# Patient Record
Sex: FEMALE | Race: OTHER | NOT HISPANIC OR LATINO | URBAN - METROPOLITAN AREA
[De-identification: names, ages, dates, MRNs, and addresses within clinical notes are randomized per-mention and may not be internally consistent; named-entity substitution may affect disease eponyms.]

---

## 2024-08-02 ENCOUNTER — APPOINTMENT (RX ONLY)
Dept: URBAN - METROPOLITAN AREA CLINIC 334 | Facility: CLINIC | Age: 34
Setting detail: DERMATOLOGY
End: 2024-08-02

## 2024-08-02 DIAGNOSIS — Z12.83 ENCOUNTER FOR SCREENING FOR MALIGNANT NEOPLASM OF SKIN: ICD-10-CM

## 2024-08-02 DIAGNOSIS — D18.0 HEMANGIOMA: ICD-10-CM

## 2024-08-02 DIAGNOSIS — L81.4 OTHER MELANIN HYPERPIGMENTATION: ICD-10-CM

## 2024-08-02 DIAGNOSIS — L82.1 OTHER SEBORRHEIC KERATOSIS: ICD-10-CM

## 2024-08-02 DIAGNOSIS — L73.2 HIDRADENITIS SUPPURATIVA: ICD-10-CM | Status: INADEQUATELY CONTROLLED

## 2024-08-02 PROBLEM — D18.01 HEMANGIOMA OF SKIN AND SUBCUTANEOUS TISSUE: Status: ACTIVE | Noted: 2024-08-02

## 2024-08-02 PROCEDURE — 99204 OFFICE O/P NEW MOD 45 MIN: CPT

## 2024-08-02 PROCEDURE — ? FULL BODY SKIN EXAM

## 2024-08-02 PROCEDURE — ? PRESCRIPTION

## 2024-08-02 PROCEDURE — ? COUNSELING

## 2024-08-02 PROCEDURE — ? TREATMENT REGIMEN

## 2024-08-02 RX ORDER — CLINDAMYCIN PHOSPHATE 10 MG/ML
SOLUTION TOPICAL QD
Qty: 60 | Refills: 4 | Status: ERX | COMMUNITY
Start: 2024-08-02

## 2024-08-02 RX ADMIN — CLINDAMYCIN PHOSPHATE: 10 SOLUTION TOPICAL at 00:00

## 2024-08-02 ASSESSMENT — LOCATION DETAILED DESCRIPTION DERM
LOCATION DETAILED: RIGHT INFERIOR MEDIAL UPPER BACK
LOCATION DETAILED: LEFT MEDIAL BREAST 10-11:00 REGION
LOCATION DETAILED: UPPER STERNUM
LOCATION DETAILED: PERIUMBILICAL SKIN
LOCATION DETAILED: MONS PUBIS

## 2024-08-02 ASSESSMENT — LOCATION SIMPLE DESCRIPTION DERM
LOCATION SIMPLE: RIGHT UPPER BACK
LOCATION SIMPLE: LEFT BREAST
LOCATION SIMPLE: CHEST
LOCATION SIMPLE: ABDOMEN
LOCATION SIMPLE: GROIN

## 2024-08-02 ASSESSMENT — LOCATION ZONE DERM
LOCATION ZONE: TRUNK
LOCATION ZONE: VULVA

## 2024-11-11 ENCOUNTER — OFFICE VISIT (OUTPATIENT)
Dept: URBAN - METROPOLITAN AREA CLINIC 9 | Facility: CLINIC | Age: 34
End: 2024-11-11

## 2024-12-30 ENCOUNTER — DASHBOARD ENCOUNTERS (OUTPATIENT)
Age: 34
End: 2024-12-30

## 2024-12-30 ENCOUNTER — OFFICE VISIT (OUTPATIENT)
Dept: URBAN - METROPOLITAN AREA CLINIC 9 | Facility: CLINIC | Age: 34
End: 2024-12-30
Payer: COMMERCIAL

## 2024-12-30 ENCOUNTER — OFFICE VISIT (OUTPATIENT)
Dept: URBAN - METROPOLITAN AREA CLINIC 9 | Facility: CLINIC | Age: 34
End: 2024-12-30

## 2024-12-30 VITALS
HEIGHT: 66 IN | SYSTOLIC BLOOD PRESSURE: 110 MMHG | BODY MASS INDEX: 37.28 KG/M2 | WEIGHT: 232 LBS | DIASTOLIC BLOOD PRESSURE: 80 MMHG

## 2024-12-30 DIAGNOSIS — K80.50 BILIARY COLIC: ICD-10-CM

## 2024-12-30 DIAGNOSIS — R10.11 RUQ PAIN: ICD-10-CM

## 2024-12-30 DIAGNOSIS — D13.5 ADENOMYOMATOSIS OF GALLBLADDER: ICD-10-CM

## 2024-12-30 DIAGNOSIS — K76.0 FATTY LIVER: ICD-10-CM

## 2024-12-30 PROCEDURE — 99204 OFFICE O/P NEW MOD 45 MIN: CPT | Performed by: STUDENT IN AN ORGANIZED HEALTH CARE EDUCATION/TRAINING PROGRAM

## 2024-12-30 RX ORDER — UBROGEPANT 50 MG/1
TAKE 1 TABLET BY MOUTH DAILY. MAY TAKE SECOND DOSE AT LEAST 2 HOURS AFTER FOR 1 DOSE AS NEEDED. DO NOT TAKE WITH NURTEC TABLET ORAL
Qty: 16 EACH | Refills: 1 | Status: ACTIVE | COMMUNITY

## 2024-12-30 RX ORDER — MAGNESIUM OXIDE/MAG AA CHELATE 300 MG
1 CAPSULE WITH A MEAL CAPSULE ORAL ONCE A DAY
Status: ACTIVE | COMMUNITY

## 2024-12-30 NOTE — HPI-TODAY'S VISIT:
EGD:   Colonoscopy:   Imaging/Studies/Procedures: CT A/P 1/25/24- increased 4.3cm complex L ovarian cyst, tubular structure in R adenexa with complex fluid.  RUQ U/S 10/17/24- gallbladder adenomyomatosis (1.6x2cm)  - -   PMH:  endometriosis s/p excision of appendix, fallopian tubes, colon, frozen pelvis, hx of IVF,   Family Hx:   GI Hx: 12/30/24- The patient, a 34-year-old female, presented with intermittent upper right quadrant pain. An ultrasound ordered by her primary care physician revealed adenomyomatosis of the gallbladder and fatty liver. The patient expressed concern about whether the gallbladder condition required surgical intervention. The patient also reported a history of endometriosis, for which she had undergone surgery, including the removal of her appendix and fallopian tubes. She had also undergone IVF treatment, resulting in one successful pregnancy and one miscarriage. The patient was planning to undergo another round of IVF and was concerned about how her gallbladder condition might affect this. The patient reported that her pain was not constant but was triggered by certain foods and sometimes felt like a fullness in the area of her gallbladder. The patient was referred to a surgeon for further evaluation and was also advised to undergo a HIDA scan to provide more information about her gallbladder function.

## 2025-01-22 ENCOUNTER — TELEPHONE ENCOUNTER (OUTPATIENT)
Dept: URBAN - METROPOLITAN AREA CLINIC 5 | Facility: CLINIC | Age: 35
End: 2025-01-22

## 2025-01-31 ENCOUNTER — LAB OUTSIDE AN ENCOUNTER (OUTPATIENT)
Dept: URBAN - METROPOLITAN AREA CLINIC 9 | Facility: CLINIC | Age: 35
End: 2025-01-31